# Patient Record
Sex: MALE
[De-identification: names, ages, dates, MRNs, and addresses within clinical notes are randomized per-mention and may not be internally consistent; named-entity substitution may affect disease eponyms.]

---

## 2020-03-06 ENCOUNTER — NURSE TRIAGE (OUTPATIENT)
Dept: OTHER | Facility: CLINIC | Age: 30
End: 2020-03-06

## 2020-03-06 NOTE — TELEPHONE ENCOUNTER
Reason for Disposition   [1] MILD pain (e.g., does not interfere with normal activities) AND [2] present > 7 days    Protocols used: KNEE PAIN-ADULT-AH    Pt c/o right knee pain. He's had issues with his knee in the past but it started to be painful last summer after he drove for a long period of time. At rest, pain rated 0/10, doing stairs 2-3/10. It is tender to touch on the side of his knee cap. Denies swelling or discoloration. He is still able to run. Caller reports symptoms as documented above. Caller informed of disposition. Care advice as documented. Please do not respond to the triage nurse through this encounter. Any subsequent communication should be directly with the patient.